# Patient Record
Sex: MALE | Race: WHITE
[De-identification: names, ages, dates, MRNs, and addresses within clinical notes are randomized per-mention and may not be internally consistent; named-entity substitution may affect disease eponyms.]

---

## 2021-11-30 ENCOUNTER — HOSPITAL ENCOUNTER (EMERGENCY)
Dept: HOSPITAL 46 - ED | Age: 26
Discharge: HOME | End: 2021-11-30
Payer: MEDICAID

## 2021-11-30 VITALS — WEIGHT: 259.99 LBS | HEIGHT: 71 IN | BODY MASS INDEX: 36.4 KG/M2

## 2021-11-30 DIAGNOSIS — S00.86XA: ICD-10-CM
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
## 2021-11-30 DIAGNOSIS — S40.862A: Primary | ICD-10-CM

## 2021-11-30 NOTE — XMS
PreManage Notification: CARLITO CHRISTIAN MRN:[id number]
 
Security Information
 
Security Events
No recent Security Events currently on file
 
 
 
CRITERIA MET
------------
- [facility] - 2 Visits in 30 Days
 
 
CARE PROVIDERS
-------------------------------------------------------------------------------------
[facility]     Current
 
PHONE: [phone]
-------------------------------------------------------------------------------------
 
Nichelle has no Care Guidelines for this patient.
 
ESUDEEP VISIT COUNT (12 MO.)
-------------------------------------------------------------------------------------
[facility] [place]
 
[address] [place] [address]
-------------------------------------------------------------------------------------
TOTAL 2
-------------------------------------------------------------------------------------
NOTE: Visits indicate total known visits.
 
ED/C VISIT TRACKING (12 MO.)
-------------------------------------------------------------------------------------
11/30/2021 12:40
[facility]
 
TYPE: Emergency
 
COMPLAINT:
- BUG BITES ALL OVER
-------------------------------------------------------------------------------------
11/19/2021 16:20
[facility]         [facility]
 
TYPE: Emergency
 
DIAGNOSES:
- Unspecified abdominal pain
-------------------------------------------------------------------------------------
 
 
INPATIENT VISIT TRACKING (12 MO.)
No inpatient visits to display in this time frame
 
[email or web address]